# Patient Record
Sex: MALE | URBAN - METROPOLITAN AREA
[De-identification: names, ages, dates, MRNs, and addresses within clinical notes are randomized per-mention and may not be internally consistent; named-entity substitution may affect disease eponyms.]

---

## 2023-03-21 ENCOUNTER — APPOINTMENT (RX ONLY)
Dept: URBAN - METROPOLITAN AREA CLINIC 107 | Facility: CLINIC | Age: 33
Setting detail: DERMATOLOGY
End: 2023-03-21

## 2023-03-21 DIAGNOSIS — B07.8 OTHER VIRAL WARTS: ICD-10-CM

## 2023-03-21 DIAGNOSIS — Z71.89 OTHER SPECIFIED COUNSELING: ICD-10-CM

## 2023-03-21 DIAGNOSIS — L72.0 EPIDERMAL CYST: ICD-10-CM

## 2023-03-21 PROCEDURE — 17111 DESTRUCTION B9 LESIONS 15/>: CPT

## 2023-03-21 PROCEDURE — ? COUNSELING

## 2023-03-21 PROCEDURE — ? DEFER

## 2023-03-21 PROCEDURE — ? BENIGN DESTRUCTION

## 2023-03-21 PROCEDURE — 99202 OFFICE O/P NEW SF 15 MIN: CPT | Mod: 25

## 2023-03-21 ASSESSMENT — LOCATION SIMPLE DESCRIPTION DERM
LOCATION SIMPLE: LEFT ANKLE
LOCATION SIMPLE: RIGHT FOOT
LOCATION SIMPLE: LEFT INDEX FINGER
LOCATION SIMPLE: RIGHT MIDDLE FINGER
LOCATION SIMPLE: RIGHT HAND
LOCATION SIMPLE: RIGHT ACHILLES SKIN
LOCATION SIMPLE: LEFT MIDDLE FINGER
LOCATION SIMPLE: RIGHT THUMB
LOCATION SIMPLE: RIGHT SCROTUM
LOCATION SIMPLE: RIGHT ANKLE
LOCATION SIMPLE: LEFT FOOT
LOCATION SIMPLE: LEFT HAND
LOCATION SIMPLE: LEFT RING FINGER

## 2023-03-21 ASSESSMENT — LOCATION DETAILED DESCRIPTION DERM
LOCATION DETAILED: LEFT INDEX PROXIMAL INTERPHALANGEAL JOINT
LOCATION DETAILED: RIGHT LATERAL DORSAL FOOT
LOCATION DETAILED: LEFT PROXIMAL DORSAL RING FINGER
LOCATION DETAILED: RIGHT POSTERIOR MEDIAL MALLEOLUS
LOCATION DETAILED: LEFT DORSAL FOOT
LOCATION DETAILED: RIGHT LATERAL MALLEOLUS
LOCATION DETAILED: LEFT LATERAL DORSAL FOOT
LOCATION DETAILED: LEFT MIDDLE PROXIMAL INTERPHALANGEAL JOINT
LOCATION DETAILED: RIGHT DORSAL FOOT
LOCATION DETAILED: LEFT LATERAL ACHILLES SKIN
LOCATION DETAILED: RIGHT RADIAL DORSAL HAND
LOCATION DETAILED: RIGHT LATERAL ACHILLES SKIN
LOCATION DETAILED: RIGHT INFERIOR MEDIAL SCROTUM
LOCATION DETAILED: RIGHT ACHILLES SKIN
LOCATION DETAILED: RIGHT MIDDLE PROXIMAL INTERPHALANGEAL JOINT
LOCATION DETAILED: LEFT ULNAR DORSAL HAND
LOCATION DETAILED: LEFT ANKLE
LOCATION DETAILED: RIGHT PROXIMAL DORSAL THUMB

## 2023-03-21 ASSESSMENT — LOCATION ZONE DERM
LOCATION ZONE: LEG
LOCATION ZONE: HAND
LOCATION ZONE: FEET
LOCATION ZONE: SCROTUM
LOCATION ZONE: FINGER

## 2023-03-21 NOTE — PROCEDURE: BENIGN DESTRUCTION
Detail Level: Detailed
Add 52 Modifier (Optional): no
Consent: The patient's consent was obtained including but not limited to risks of crusting, scabbing, blistering, scarring, darker or lighter pigmentary change, recurrence, incomplete removal and infection.
Medical Necessity Information: It is in your best interest to select a reason for this procedure from the list below. All of these items fulfill various CMS LCD requirements except the new and changing color options.
Medical Necessity Clause: This procedure was medically necessary because the lesions that were treated were:
Post-Care Instructions: I reviewed with the patient in detail post-care instructions. Patient is to wear sunprotection, and avoid picking at any of the treated lesions. Pt may apply Vaseline to crusted or scabbing areas.
Treatment Number (Will Not Render If 0): 0

## 2023-03-21 NOTE — PROCEDURE: MIPS QUALITY
Detail Level: Detailed
Additional Notes: Pt does not want vaccine
Quality 110: Preventive Care And Screening: Influenza Immunization: Influenza immunization was not ordered or administered, reason not given

## 2023-03-21 NOTE — PROCEDURE: DEFER
Size Of Lesion In Cm (Optional): 0
Introduction Text (Please End With A Colon): The following procedure was deferred: To consider scheduling surgical removal upon next visit.
Detail Level: Detailed